# Patient Record
Sex: FEMALE | Race: WHITE | NOT HISPANIC OR LATINO
[De-identification: names, ages, dates, MRNs, and addresses within clinical notes are randomized per-mention and may not be internally consistent; named-entity substitution may affect disease eponyms.]

---

## 2018-09-06 ENCOUNTER — RESULT REVIEW (OUTPATIENT)
Age: 63
End: 2018-09-06

## 2019-07-30 PROBLEM — Z00.00 ENCOUNTER FOR PREVENTIVE HEALTH EXAMINATION: Status: ACTIVE | Noted: 2019-07-30

## 2019-08-09 ENCOUNTER — NON-APPOINTMENT (OUTPATIENT)
Age: 64
End: 2019-08-09

## 2019-08-09 ENCOUNTER — APPOINTMENT (OUTPATIENT)
Dept: CARDIOLOGY | Facility: CLINIC | Age: 64
End: 2019-08-09
Payer: COMMERCIAL

## 2019-08-09 VITALS
OXYGEN SATURATION: 97 % | HEIGHT: 64 IN | WEIGHT: 191 LBS | DIASTOLIC BLOOD PRESSURE: 79 MMHG | HEART RATE: 73 BPM | BODY MASS INDEX: 32.61 KG/M2 | SYSTOLIC BLOOD PRESSURE: 119 MMHG

## 2019-08-09 DIAGNOSIS — Z83.3 FAMILY HISTORY OF DIABETES MELLITUS: ICD-10-CM

## 2019-08-09 DIAGNOSIS — Z87.891 PERSONAL HISTORY OF NICOTINE DEPENDENCE: ICD-10-CM

## 2019-08-09 DIAGNOSIS — Z87.898 PERSONAL HISTORY OF OTHER SPECIFIED CONDITIONS: ICD-10-CM

## 2019-08-09 DIAGNOSIS — Z78.9 OTHER SPECIFIED HEALTH STATUS: ICD-10-CM

## 2019-08-09 DIAGNOSIS — Z86.59 PERSONAL HISTORY OF OTHER MENTAL AND BEHAVIORAL DISORDERS: ICD-10-CM

## 2019-08-09 DIAGNOSIS — Z82.49 FAMILY HISTORY OF ISCHEMIC HEART DISEASE AND OTHER DISEASES OF THE CIRCULATORY SYSTEM: ICD-10-CM

## 2019-08-09 DIAGNOSIS — R06.02 SHORTNESS OF BREATH: ICD-10-CM

## 2019-08-09 DIAGNOSIS — Z80.0 FAMILY HISTORY OF MALIGNANT NEOPLASM OF DIGESTIVE ORGANS: ICD-10-CM

## 2019-08-09 DIAGNOSIS — R53.83 OTHER FATIGUE: ICD-10-CM

## 2019-08-09 PROCEDURE — 99244 OFF/OP CNSLTJ NEW/EST MOD 40: CPT | Mod: 25

## 2019-08-09 PROCEDURE — 93000 ELECTROCARDIOGRAM COMPLETE: CPT

## 2019-08-09 RX ORDER — BUDESONIDE AND FORMOTEROL FUMARATE DIHYDRATE 160; 4.5 UG/1; UG/1
160-4.5 AEROSOL RESPIRATORY (INHALATION)
Refills: 0 | Status: ACTIVE | COMMUNITY

## 2019-08-09 RX ORDER — MONTELUKAST 10 MG/1
10 TABLET, FILM COATED ORAL
Refills: 0 | Status: ACTIVE | COMMUNITY

## 2019-08-09 RX ORDER — PNV NO.95/FERROUS FUM/FOLIC AC 28MG-0.8MG
TABLET ORAL
Refills: 0 | Status: ACTIVE | COMMUNITY

## 2019-08-09 RX ORDER — LEVOCETIRIZINE DIHYDROCHLORIDE 5 MG/1
5 TABLET, FILM COATED ORAL
Refills: 0 | Status: ACTIVE | COMMUNITY

## 2019-08-09 NOTE — HISTORY OF PRESENT ILLNESS
[FreeTextEntry1] : Pt is a 63 y/o F who is referred here today by their PCP for evaluation.  She states that she has been feeling fatigued recently.  She notes that her B12 was found to be low and she has started supplements in hops it will help her symptoms.  She also states that her chest feels heavy when she coughs.  She notes SOB - she gets SOB when she climbs her second set of stairs - has gotten worse.  Pt denies CP, diaphoresis, palpitations, dizziness, syncope, LE edema, PND. \par \par PCP Dr Camargo\par PMH: asthma, anxiety, sinusitis\par Employed as an \par Family hx: mother CHF/DM 65, brother PCI 56\par Smoking status: quit at age 24\par social ETOH\par no drug use\par Current exercise: none\par Daily water intake: 64 oz\par Daily caffeine intake: 3 cups coffee\par OTC medications: B12\par Previous cardiac testing: none\par Previous hospitalizations: sinus infection\par 3 children - no problem with pregnancies\par LMP at age 44

## 2019-08-09 NOTE — DISCUSSION/SUMMARY
[FreeTextEntry1] : Pt is a 63 y/o F who presents today with SOB and fatigue\par Will check transthoracic echocardiogram to evaluate left ventricular function and assess for any structural abnormalities\par Will check nuclear stress test to eval for ischemia\par Advised pt to go to the nearest ED if symptoms persist or worsen\par VSS\par The described plan was discussed with the pt.  All questions and concerns were addressed to the best of my knowledge.

## 2019-08-09 NOTE — PHYSICAL EXAM
[General Appearance - Well Developed] : well developed [Normal Appearance] : normal appearance [Well Groomed] : well groomed [General Appearance - Well Nourished] : well nourished [General Appearance - In No Acute Distress] : no acute distress [No Deformities] : no deformities [Normal Conjunctiva] : the conjunctiva exhibited no abnormalities [Eyelids - No Xanthelasma] : the eyelids demonstrated no xanthelasmas [Normal Oral Mucosa] : normal oral mucosa [No Oral Pallor] : no oral pallor [No Oral Cyanosis] : no oral cyanosis [Heart Rate And Rhythm] : heart rate and rhythm were normal [Heart Sounds] : normal S1 and S2 [Murmurs] : no murmurs present [Arterial Pulses Normal] : the arterial pulses were normal [Edema] : no peripheral edema present [Exaggerated Use Of Accessory Muscles For Inspiration] : no accessory muscle use [Respiration, Rhythm And Depth] : normal respiratory rhythm and effort [Auscultation Breath Sounds / Voice Sounds] : lungs were clear to auscultation bilaterally [Abdomen Soft] : soft [Abdomen Tenderness] : non-tender [Abdomen Mass (___ Cm)] : no abdominal mass palpated [Gait - Sufficient For Exercise Testing] : the gait was sufficient for exercise testing [Abnormal Walk] : normal gait [Nail Clubbing] : no clubbing of the fingernails [Cyanosis, Localized] : no localized cyanosis [Petechial Hemorrhages (___cm)] : no petechial hemorrhages [] : no ischemic changes [Oriented To Time, Place, And Person] : oriented to person, place, and time [Impaired Insight] : insight and judgment were intact [Affect] : the affect was normal [Mood] : the mood was normal [No Anxiety] : not feeling anxious [FreeTextEntry1] : no JVD or bruits

## 2019-08-09 NOTE — REVIEW OF SYSTEMS
[Negative] : Heme/Lymph [Feeling Fatigued] : feeling fatigued [see HPI] : see HPI [Shortness Of Breath] : shortness of breath

## 2019-09-10 ENCOUNTER — RECORD ABSTRACTING (OUTPATIENT)
Age: 64
End: 2019-09-10

## 2019-09-10 DIAGNOSIS — Z87.898 PERSONAL HISTORY OF OTHER SPECIFIED CONDITIONS: ICD-10-CM

## 2019-09-10 DIAGNOSIS — R23.2 FLUSHING: ICD-10-CM

## 2019-09-10 LAB — CYTOLOGY CVX/VAG DOC THIN PREP: NORMAL

## 2019-09-11 ENCOUNTER — APPOINTMENT (OUTPATIENT)
Dept: CARDIOLOGY | Facility: CLINIC | Age: 64
End: 2019-09-11
Payer: COMMERCIAL

## 2019-09-11 PROCEDURE — 93017 CV STRESS TEST TRACING ONLY: CPT

## 2019-09-11 PROCEDURE — 78452 HT MUSCLE IMAGE SPECT MULT: CPT

## 2019-09-11 PROCEDURE — A9500: CPT

## 2019-09-11 PROCEDURE — 93018 CV STRESS TEST I&R ONLY: CPT

## 2019-09-13 ENCOUNTER — APPOINTMENT (OUTPATIENT)
Dept: CARDIOLOGY | Facility: CLINIC | Age: 64
End: 2019-09-13
Payer: COMMERCIAL

## 2019-09-13 PROCEDURE — 93306 TTE W/DOPPLER COMPLETE: CPT

## 2019-09-19 ENCOUNTER — APPOINTMENT (OUTPATIENT)
Dept: OBGYN | Facility: CLINIC | Age: 64
End: 2019-09-19
Payer: COMMERCIAL

## 2019-09-19 VITALS
BODY MASS INDEX: 31.58 KG/M2 | DIASTOLIC BLOOD PRESSURE: 74 MMHG | HEIGHT: 64 IN | WEIGHT: 185 LBS | SYSTOLIC BLOOD PRESSURE: 110 MMHG

## 2019-09-19 DIAGNOSIS — Z01.419 ENCOUNTER FOR GYNECOLOGICAL EXAMINATION (GENERAL) (ROUTINE) W/OUT ABNORMAL FINDINGS: ICD-10-CM

## 2019-09-19 PROCEDURE — 99396 PREV VISIT EST AGE 40-64: CPT

## 2019-09-19 RX ORDER — FLUTICASONE FUROATE AND VILANTEROL TRIFENATATE 50; 25 UG/1; UG/1
POWDER RESPIRATORY (INHALATION)
Refills: 0 | Status: ACTIVE | COMMUNITY

## 2019-09-19 NOTE — COUNSELING
[Breast Self Exam] : breast self exam [Nutrition] : nutrition [Exercise] : exercise [Vitamins/Supplements] : vitamins/supplements [FreeTextEntry2] : the use of vaginal lubricants was reviewed

## 2019-09-19 NOTE — HISTORY OF PRESENT ILLNESS
[1 Year Ago] : 1 year ago [Good] : being in good health [Last Pap ___] : Last cervical pap smear was [unfilled] [Postmenopausal] : is postmenopausal [Last Mammogram ___] : Last Mammogram was [unfilled] [Last Bone Density ___] : Last bone density studies [unfilled] [Pregnancy History] : pregnancy history: [Menarche Age: ____] : age at menarche was [unfilled] [Post-Menopause, No Sxs] : post-menopausal, currently without symptoms [NA] : N/A [Currently In Menopause] : currently in menopause [Menstrual Problems] : reports normal menses [Contraception] : does not use contraception [Sexually Active] : is not sexually active

## 2019-09-19 NOTE — REVIEW OF SYSTEMS
[Sleep Disturbances] : sleep disturbances [Anxiety] : anxiety [Depression] : depression [Nl] : Integumentary [FreeTextEntry1] : dry skin

## 2019-09-19 NOTE — PHYSICAL EXAM
[Awake] : awake [Alert] : alert [Mass] : no breast mass [Nipple Discharge] : no nipple discharge [Axillary LAD] : no axillary lymphadenopathy [Soft] : soft [Tender] : non tender [Distended] : not distended [Oriented x3] : oriented to person, place, and time [Normal] : uterus [Atrophy] : atrophy [Motion Tenderness] : there was no cervical motion tenderness [Tenderness] : nontender [Enlarged ___ wks] : not enlarged [Mass ___ cm] : no uterine mass was palpated [Uterine Adnexae] : were not tender and not enlarged

## 2019-09-23 LAB — HPV HIGH+LOW RISK DNA PNL CVX: NOT DETECTED

## 2019-09-26 LAB — CYTOLOGY CVX/VAG DOC THIN PREP: ABNORMAL

## 2019-10-03 ENCOUNTER — TRANSCRIPTION ENCOUNTER (OUTPATIENT)
Age: 64
End: 2019-10-03

## 2020-12-21 PROBLEM — Z01.419 ENCOUNTER FOR WELL WOMAN EXAM WITH ROUTINE GYNECOLOGICAL EXAM: Status: RESOLVED | Noted: 2019-09-19 | Resolved: 2020-12-21

## 2021-06-14 ENCOUNTER — NON-APPOINTMENT (OUTPATIENT)
Age: 66
End: 2021-06-14

## 2021-06-18 ENCOUNTER — APPOINTMENT (OUTPATIENT)
Dept: CARDIOLOGY | Facility: CLINIC | Age: 66
End: 2021-06-18
Payer: COMMERCIAL

## 2021-06-18 VITALS
SYSTOLIC BLOOD PRESSURE: 150 MMHG | BODY MASS INDEX: 34.15 KG/M2 | DIASTOLIC BLOOD PRESSURE: 90 MMHG | HEIGHT: 64 IN | OXYGEN SATURATION: 96 % | HEART RATE: 70 BPM | WEIGHT: 200 LBS

## 2021-06-18 DIAGNOSIS — M79.602 PAIN IN LEFT ARM: ICD-10-CM

## 2021-06-18 PROCEDURE — 99072 ADDL SUPL MATRL&STAF TM PHE: CPT

## 2021-06-18 PROCEDURE — 99214 OFFICE O/P EST MOD 30 MIN: CPT

## 2021-06-18 NOTE — DISCUSSION/SUMMARY
[FreeTextEntry1] : Pt is a 67 y/o F who presents today with back pain radiating to L arm\par check CCTA to eval for CAD\par repeat TTE\par Advised pt to go to the nearest ED if symptoms persist or worsen\par BP elevated today - will monitor\par The described plan was discussed with the pt.  All questions and concerns were addressed to the best of my knowledge.

## 2021-06-18 NOTE — HISTORY OF PRESENT ILLNESS
[FreeTextEntry1] : Pt is a 65 y/o F who is referred here today by their PCP for evaluation.  She states that about 1 week ago she started having upper L back pain which then started to radiate down L arm.  Heating pad and tylenol help with the pain.  Pain is constant.  When she pushes on her back, pain worsens.  She denies SOB, diaphoresis, palpitations, dizziness, syncope, LE edema, PND, orthopnea.  She has been going for walks the past few days for 30min which does not make symptoms worse\par COVID vaccine 03/2021 Pfizer\par \par nuc stress test 06/2019 9 METS, normal myocardial perfusion, EF >70%\par TTE 09/2019 EF 58% without significant valvular pathology\par \par PCP Dr Camargo\par PMH: asthma, anxiety, sinusitis\par Employed as an \par Family hx: mother CHF/DM/HTN 75, brother PCI 56 (RXT for lymphoma)\par Smoking status: quit at age 24\par social ETOH\par no drug use\par Current exercise: none\par Daily water intake: 64 oz\par Daily caffeine intake: 3 cups coffee\par OTC medications: B12\par Previous cardiac testing: none\par Previous hospitalizations: sinus infection\par 3 children - no problem with pregnancies\par LMP at age 44

## 2021-06-18 NOTE — HISTORY OF PRESENT ILLNESS
[FreeTextEntry1] : Pt is a 67 y/o F who is referred here today by their PCP for evaluation.  She states that about 1 week ago she started having upper L back pain which then started to radiate down L arm.  Heating pad and tylenol help with the pain.  Pain is constant.  When she pushes on her back, pain worsens.  She denies SOB, diaphoresis, palpitations, dizziness, syncope, LE edema, PND, orthopnea.  She has been going for walks the past few days for 30min which does not make symptoms worse\par COVID vaccine 03/2021 Pfizer\par \par nuc stress test 06/2019 9 METS, normal myocardial perfusion, EF >70%\par TTE 09/2019 EF 58% without significant valvular pathology\par \par PCP Dr Camargo\par PMH: asthma, anxiety, sinusitis\par Employed as an \par Family hx: mother CHF/DM/HTN 75, brother PCI 56 (RXT for lymphoma)\par Smoking status: quit at age 24\par social ETOH\par no drug use\par Current exercise: none\par Daily water intake: 64 oz\par Daily caffeine intake: 3 cups coffee\par OTC medications: B12\par Previous cardiac testing: none\par Previous hospitalizations: sinus infection\par 3 children - no problem with pregnancies\par LMP at age 44

## 2021-06-18 NOTE — REVIEW OF SYSTEMS
[Negative] : Heme/Lymph [SOB] : no shortness of breath [Dyspnea on exertion] : not dyspnea during exertion [Chest Discomfort] : no chest discomfort [Leg Claudication] : no intermittent leg claudication [Palpitations] : no palpitations [Syncope] : no syncope [FreeTextEntry9] : back pain, L arm pain

## 2021-06-18 NOTE — DISCUSSION/SUMMARY
[FreeTextEntry1] : Pt is a 65 y/o F who presents today with back pain radiating to L arm\par check CCTA to eval for CAD\par repeat TTE\par Advised pt to go to the nearest ED if symptoms persist or worsen\par BP elevated today - will monitor\par The described plan was discussed with the pt.  All questions and concerns were addressed to the best of my knowledge.

## 2021-08-02 ENCOUNTER — APPOINTMENT (OUTPATIENT)
Dept: CARDIOLOGY | Facility: CLINIC | Age: 66
End: 2021-08-02
Payer: COMMERCIAL

## 2021-08-02 VITALS
BODY MASS INDEX: 34.15 KG/M2 | DIASTOLIC BLOOD PRESSURE: 80 MMHG | HEIGHT: 64 IN | SYSTOLIC BLOOD PRESSURE: 128 MMHG | HEART RATE: 59 BPM | WEIGHT: 200 LBS | OXYGEN SATURATION: 97 %

## 2021-08-02 DIAGNOSIS — M54.9 DORSALGIA, UNSPECIFIED: ICD-10-CM

## 2021-08-02 PROCEDURE — 99213 OFFICE O/P EST LOW 20 MIN: CPT

## 2021-08-02 PROCEDURE — 93306 TTE W/DOPPLER COMPLETE: CPT

## 2021-08-02 NOTE — HISTORY OF PRESENT ILLNESS
[FreeTextEntry1] : Pt is a 67 y/o F who presents today for f/u.  She is feeling well overall - still with some mid back pain at times.  She denies CP, SOB, diaphoresis, palpitations, dizziness, syncope, LE edema, PND, orthopnea.  Her cardaic testing has been normal as mentione dbelow\par COVID vaccine 03/2021 Pfizer\par \par CCTA 07/2021 normal coronary arteries\par nuc stress test 06/2019 9 METS, normal myocardial perfusion, EF >70%\par TTE 09/2019 EF 58% without significant valvular pathology\par TTE 08/2021 EF 60%, mild TR\par \par PCP Dr Camargo\par PMH: asthma, anxiety, sinusitis\par Employed as an \par Family hx: mother CHF/DM/HTN 75, brother PCI 56 (RXT for lymphoma)\par Smoking status: quit at age 24\par social ETOH\par no drug use\par Current exercise: none\par Daily water intake: 64 oz\par Daily caffeine intake: 3 cups coffee\par OTC medications: B12\par Previous cardiac testing: none\par Previous hospitalizations: sinus infection\par 3 children - no problem with pregnancies\par LMP at age 44

## 2021-08-02 NOTE — DISCUSSION/SUMMARY
[FreeTextEntry1] : Pt is a 67 y/o F who presents today for f/u.  She is feeling well overall - still with some mid back pain at times.  She denies CP, SOB, diaphoresis, palpitations, dizziness, syncope, LE edema, PND, orthopnea.  \par \par CCTA 07/2021 normal coronary arteries\par nuc stress test 06/2019 9 METS, normal myocardial perfusion, EF >70%\par TTE 09/2019 EF 58% without significant valvular pathology\par TTE 08/2021 EF 60%, mild TR\par \par Pt will return in 12 mnths or sooner as needed but I encouraged communication via phone or portal if necessary. \par The described plan was discussed with the pt.  All questions and concerns were addressed to the best of my knowledge.

## 2021-11-15 ENCOUNTER — APPOINTMENT (OUTPATIENT)
Dept: OBGYN | Facility: CLINIC | Age: 66
End: 2021-11-15
Payer: COMMERCIAL

## 2021-11-15 DIAGNOSIS — R92.2 INCONCLUSIVE MAMMOGRAM: ICD-10-CM

## 2021-11-15 DIAGNOSIS — Z82.49 FAMILY HISTORY OF ISCHEMIC HEART DISEASE AND OTHER DISEASES OF THE CIRCULATORY SYSTEM: ICD-10-CM

## 2021-11-15 DIAGNOSIS — N39.498 OTHER SPECIFIED URINARY INCONTINENCE: ICD-10-CM

## 2021-11-15 DIAGNOSIS — L23.9 ALLERGIC CONTACT DERMATITIS, UNSPECIFIED CAUSE: ICD-10-CM

## 2021-11-15 DIAGNOSIS — N95.2 POSTMENOPAUSAL ATROPHIC VAGINITIS: ICD-10-CM

## 2021-11-15 DIAGNOSIS — Z87.09 PERSONAL HISTORY OF OTHER DISEASES OF THE RESPIRATORY SYSTEM: ICD-10-CM

## 2021-11-15 DIAGNOSIS — Z52.3 BONE MARROW DONOR: ICD-10-CM

## 2021-11-15 PROCEDURE — 99214 OFFICE O/P EST MOD 30 MIN: CPT | Mod: 95

## 2021-11-15 RX ORDER — ESTRADIOL 0.1 MG/G
0.1 CREAM VAGINAL
Qty: 3 | Refills: 3 | Status: ACTIVE | COMMUNITY
Start: 2021-11-15 | End: 1900-01-01

## 2021-11-15 RX ORDER — PREDNISOLONE ACETATE 10 MG/ML
1 SUSPENSION/ DROPS OPHTHALMIC
Qty: 5 | Refills: 0 | Status: DISCONTINUED | COMMUNITY
Start: 2021-07-22

## 2021-11-15 RX ORDER — MOXIFLOXACIN OPHTHALMIC 5 MG/ML
0.5 SOLUTION/ DROPS OPHTHALMIC
Qty: 3 | Refills: 0 | Status: DISCONTINUED | COMMUNITY
Start: 2021-06-18

## 2021-11-15 RX ORDER — KETOROLAC TROMETHAMINE 5 MG/ML
0.5 SOLUTION OPHTHALMIC
Qty: 5 | Refills: 0 | Status: DISCONTINUED | COMMUNITY
Start: 2021-07-22

## 2021-11-15 NOTE — HISTORY OF PRESENT ILLNESS
[Patient reported mammogram was normal] : Patient reported mammogram was normal [Patient reported breast sonogram was normal] : Patient reported breast sonogram was normal [Patient reported PAP Smear was normal] : Patient reported PAP Smear was normal [Y] : Positive pregnancy history [Mammogramdate] : 8/2021 [TextBox_19] : KAE SHINE, NOT ON CHART [PapSmeardate] : 2019 [BreastSonogramDate] : 8/2021 [TextBox_31] : HPV NEGATIVE [PGHxTotal] : 3 [BoneDensityDate] : 2015 [[facility]] : 3 [[facility] Centeriving] : 3 [Yes] : Patient has concerns regarding sex [Previously active] : previously active [Men] : men [Vaginal] : vaginal [No] : No [FreeTextEntry1] : VAGINAL DRYNESS [FreeTextEntry2] :  OF 47 YEARS

## 2021-11-15 NOTE — PLAN
[FreeTextEntry1] : LIKELY GUSM WITH URINARY INCONTINENCE AND VAGINAL DRYNESS WITH PAINFUL INTERCOURSE.  TRIAL OF ESTRADIOL CREAM, REFERRAL FOR UROGYN, PELVIC FLOOR THERAPY.  \par \par OUTSIDE STUDIES, MAMMO/SONO AT [place], [place].  \par \par RX DEXA, PAP AT VISIT.

## 2021-11-15 NOTE — REASON FOR VISIT
[Other Location: e.g. School (Enter Location, City,State)___] : at [unfilled], at the time of the visit. [Other Location: e.g. Home (Enter Location, City,State)___] : at [unfilled] [Initial] : an initial consultation for

## 2022-06-23 ENCOUNTER — APPOINTMENT (OUTPATIENT)
Dept: OBGYN | Facility: CLINIC | Age: 67
End: 2022-06-23

## 2022-08-22 ENCOUNTER — APPOINTMENT (OUTPATIENT)
Dept: CARDIOLOGY | Facility: CLINIC | Age: 67
End: 2022-08-22